# Patient Record
Sex: MALE | Race: WHITE | NOT HISPANIC OR LATINO | Employment: UNEMPLOYED | URBAN - METROPOLITAN AREA
[De-identification: names, ages, dates, MRNs, and addresses within clinical notes are randomized per-mention and may not be internally consistent; named-entity substitution may affect disease eponyms.]

---

## 2017-08-07 ENCOUNTER — OFFICE VISIT (OUTPATIENT)
Dept: FAMILY MEDICINE | Facility: CLINIC | Age: 22
End: 2017-08-07
Payer: COMMERCIAL

## 2017-08-07 VITALS
HEART RATE: 80 BPM | HEIGHT: 69 IN | RESPIRATION RATE: 12 BRPM | SYSTOLIC BLOOD PRESSURE: 134 MMHG | WEIGHT: 176.7 LBS | BODY MASS INDEX: 26.17 KG/M2 | TEMPERATURE: 97.9 F | DIASTOLIC BLOOD PRESSURE: 60 MMHG

## 2017-08-07 DIAGNOSIS — Z11.3 SCREEN FOR STD (SEXUALLY TRANSMITTED DISEASE): Primary | ICD-10-CM

## 2017-08-07 PROCEDURE — 86780 TREPONEMA PALLIDUM: CPT | Performed by: PHYSICIAN ASSISTANT

## 2017-08-07 PROCEDURE — 87491 CHLMYD TRACH DNA AMP PROBE: CPT | Performed by: PHYSICIAN ASSISTANT

## 2017-08-07 PROCEDURE — 36415 COLL VENOUS BLD VENIPUNCTURE: CPT | Performed by: PHYSICIAN ASSISTANT

## 2017-08-07 PROCEDURE — 99213 OFFICE O/P EST LOW 20 MIN: CPT | Performed by: PHYSICIAN ASSISTANT

## 2017-08-07 PROCEDURE — 87591 N.GONORRHOEAE DNA AMP PROB: CPT | Performed by: PHYSICIAN ASSISTANT

## 2017-08-07 PROCEDURE — 87389 HIV-1 AG W/HIV-1&-2 AB AG IA: CPT | Performed by: PHYSICIAN ASSISTANT

## 2017-08-07 NOTE — NURSING NOTE
"Chief Complaint   Patient presents with     STD     Panel Management     HPV, Chlamydia       Initial /60 (BP Location: Left arm, Patient Position: Chair, Cuff Size: Adult Regular)  Pulse 80  Temp 97.9  F (36.6  C) (Temporal)  Resp 12  Ht 5' 8.5\" (1.74 m)  Wt 176 lb 11.2 oz (80.2 kg)  BMI 26.47 kg/m2 Estimated body mass index is 26.47 kg/(m^2) as calculated from the following:    Height as of this encounter: 5' 8.5\" (1.74 m).    Weight as of this encounter: 176 lb 11.2 oz (80.2 kg).  Medication Reconciliation: complete     Kat Montes, [facility]  August 7, 2017      "

## 2017-08-07 NOTE — PROGRESS NOTES
"  SUBJECTIVE:                                                    Mario Villafana is a 22 year old male who presents to clinic today for the following health issues:      Concern - STD Check  Onset: over weekend    Description:   Had intercourse over weekend and would like to be check: ~ 72hrs ago    Progression of Symptoms:  none    Accompanying Signs & Symptoms:  none    Previous history of similar problem:   none    Problem list and histories reviewed & adjusted, as indicated.  Additional history: as documented    Had intercourse on Friday - ~72hrs ago- with new partner. Did use condoms. Someone does not hx for.   No personal hx of STDs.     Does not hurt when he voids. No swelling, redness to the skin.  No fevers, chills  No dysuria  No hematuria  No frequency, urgency  No discharge from the penis  No ulcers, sores, lumps/bumps.  No rectal pain  Had testicular torsion surgery left side - 18yr old. Maybe it \"lincoln hurts\" since the sexual encounter. \"Mild\". Off an on.  States he is anxious. Does not want parents to know, he is on their insurance, aware they may see bills, testing codes. \"States I'll deal with that later and still would like testing\".   Not sure he wants to do a physical exam.    Call cell with results.    Is leaving to go back to college next week.     ROS:  Constitutional, HEENT, cardiovascular, pulmonary, gi and gu systems are negative, except as otherwise noted.      OBJECTIVE:   /60 (BP Location: Left arm, Patient Position: Chair, Cuff Size: Adult Regular)  Pulse 80  Temp 97.9  F (36.6  C) (Temporal)  Resp 12  Ht 5' 8.5\" (1.74 m)  Wt 176 lb 11.2 oz (80.2 kg)  BMI 26.47 kg/m2  Body mass index is 26.47 kg/(m^2).  GENERAL: healthy, alert and no distress   (male): testicles normal without atrophy or masses, no epididymal enlargement or significant tenderness, no hernias and penis normal without urethral discharge  MS: no gross musculoskeletal defects noted  Psych: anxious mood and " "affect    Diagnostic Test Results:  pending    ASSESSMENT/PLAN:     1. Screen for STD (sexually transmitted disease)  Safe sex practices discussed and encouraged. STD screening as below.   Discussed considering retesting of HIV, syphilis (since early in exposure window today)  Discussed s/sx of STDs, should be retested if develops sx  If negative testing but has worsening left sided sx would treat for epididymitis. Offered empiric treatment today, pt declined, wants to wait on testing.   Able to remove MyChart proxy, but discussed cannot \"edit\" what insurance statements have for billing codes/testing.  - Chlamydia trachomatis PCR  - Neisseria gonorrhoeae PCR  - HIV Antigen Antibody Combo  - Anti Treponema    Follow Up: For worsening symptoms (ie new fevers, worsening pain, etc), non-improvement as expected/discussed, questions regarding your medications or treatment plan. Discussed parameters for follow up and included in After Visit Summary given to patient.      Rosamaria Morel PA-C  [facility]    "

## 2017-08-07 NOTE — MR AVS SNAPSHOT
After Visit Summary   8/7/2017    Mario Villafana    MRN: [id number]           Patient Information     Date Of Birth          1995        Visit Information        Provider Department      8/7/2017 2:00 PM Rosamaria Morel PA-C [facility]        Today's Diagnoses     Screen for STD (sexually transmitted disease)    -  1      Care Instructions    Will do STD screening - chlamydia, gonorrhea, HIV and syphilis    If the area becomes on the left becomes more tender, swollen, would treat for epididymitis    If you start to have symptoms of burning with urination or dripping from the penis or pain in scrotum follow up for recheck ([facility], etc)                Follow-ups after your visit        Who to contact     If you have questions or need follow up information about today's clinic visit or your schedule please contact [facility] directly at [phone].  Normal or non-critical lab and imaging results will be communicated to you by MyChart, letter or phone within 4 business days after the clinic has received the results. If you do not hear from us within 7 days, please contact the clinic through [employer] or phone. If you have a critical or abnormal lab result, we will notify you by phone as soon as possible.  Submit refill requests through [employer] or call your pharmacy and they will forward the refill request to us. Please allow 3 business days for your refill to be completed.          Additional Information About Your Visit        MyChart Information     [employer] gives you secure access to your electronic health record. If you see a primary care provider, you can also send messages to your care team and make appointments. If you have questions, please call your primary care clinic.  If you do not have a primary care provider, please call [phone] and they will assist you.        Care EveryWhere ID     This is your Care EveryWhere ID. This could be used by  "other organizations to access your [place] medical records  [id number]        Your Vitals Were     Pulse Temperature Respirations Height BMI (Body Mass Index)       80 97.9  F (36.6  C) (Temporal) 12 5' 8.5\" (1.74 m) 26.47 kg/m2        Blood Pressure from Last 3 Encounters:   08/07/17 134/60   01/13/16 120/70   08/06/15 118/58    Weight from Last 3 Encounters:   08/07/17 176 lb 11.2 oz (80.2 kg)   01/13/16 152 lb 14.4 oz (69.4 kg) (54 %)*   08/06/15 148 lb 12.8 oz (67.5 kg) (51 %)*     * Growth percentiles are based on [facility] 2-20 Years data.              We Performed the Following     Anti Treponema     Chlamydia trachomatis PCR     HIV Antigen Antibody Combo     Neisseria gonorrhoeae PCR        Primary Care Provider Office Phone # Fax #    Mitzi Shen [phone]       [facility] [address] [address] [address]        Equal Access to Services     [facility]: Hadii aad ku hadasho Soomaali, waaxda luqadaha, qaybta kaalmada ademateusz, diana starkey . So [facility] [phone].    ATENCIÓN: Si habla español, tiene a wade disposición servicios gratuitos de asistencia lingüística. Llame al [phone].    We comply with applicable federal civil rights laws and [place] laws. We do not discriminate on the basis of race, color, national origin, age, disability sex, sexual orientation or gender identity.            Thank you!     Thank you for choosing [facility]  for your care. Our goal is always to provide you with excellent care. Hearing back from our patients is one way we can continue to improve our services. Please take a few minutes to complete the written survey that you may receive in the mail after your visit with us. Thank you!             Your Updated Medication List - Protect others around you: Learn how to safely use, store and throw away your medicines at www.disposemymeds.org.          This list is accurate as of: 8/7/17  2:49 PM.  " Always use your most recent med list.                   Brand Name Dispense Instructions for use Diagnosis    IBUPROFEN PO           MUCINEX ALLERGY PO       Tonsillitis       NYQUIL PO           SUDAFED PO       Tonsillitis

## 2017-08-07 NOTE — PATIENT INSTRUCTIONS
Will do STD screening - chlamydia, gonorrhea, HIV and syphilis    If the area becomes on the left becomes more tender, swollen, would treat for epididymitis    If you start to have symptoms of burning with urination or dripping from the penis or pain in scrotum [facility] ([facility])

## 2017-08-08 LAB
C TRACH DNA SPEC QL NAA+PROBE: NORMAL
HIV 1+2 AB+HIV1 P24 AG SERPL QL IA: NORMAL
N GONORRHOEA DNA SPEC QL NAA+PROBE: NORMAL
SPECIMEN SOURCE: NORMAL
SPECIMEN SOURCE: NORMAL
T PALLIDUM IGG+IGM SER QL: NEGATIVE

## 2017-08-09 NOTE — PROGRESS NOTES
"  SUBJECTIVE:                                                    Mario Villafana is a 22 year old male who presents to clinic today for the following health issues:    Dysuria - was seen 08/07/17 for STD screen. Had a partner did not know well x1 7 days ago.   Is concerned having sx that could be STD.  Mild pain left scrotum- maybe better. \"Sometimes hurts when pee comes out\". Denies fevers, chills, penile discharge, frequency, urgency, hematuria. Denies ulcers, sores, bumps in genital area.  STD screening from 08/07/17 was negative.    Continues to be concerned about how this will show up on parents insurance statements. This was discussed at the last visit.     2. Acute Illness   Acute illness concerns: congestion  Onset: 1 week    Fever: no    Chills/Sweats: no    Headache (location?): no    Sinus Pressure:no    Conjunctivitis:  YES: bilateral a little bit but not really.     Ear Pain: YES: left a little bit    Rhinorrhea: no    Congestion: YES    Sore Throat: YES     Cough: YES-non-productive    Wheeze: YES- with exercise-- with running hard.    No CP, no SOB at rest, no pleuritic pain.      Decreased Appetite: no    Nausea: no    Vomiting: no    Diarrhea:  no    Dysuria/Freq.: no    Fatigue/Achiness: no    Sick/Strep Exposure: no    No asthma    \"I had something like this in January and was given a z-pack and it got better\"   Therapies Tried and outcome: none.    Problem list and histories reviewed & adjusted, as indicated.  Additional history: as documented    Patient Active Problem List   Diagnosis     CARDIOVASCULAR SCREENING; LDL GOAL LESS THAN 160     Past Surgical History:   Procedure Laterality Date     orchidopexy Bilateral 06/17/14    [facility]       Social History   Substance Use Topics     Smoking status: Never Smoker     Smokeless tobacco: Never Used     Alcohol use No     Family History   Problem Relation Age of Onset     Coronary Artery Disease No family hx of      Hypertension No " "family hx of      Hyperlipidemia No family hx of      Cancer - colorectal No family hx of      Ovarian Cancer No family hx of      Other Cancer No family hx of      Depression/Anxiety No family hx of      Mental Illness No family hx of      Thyroid Disease No family hx of      Asthma No family hx of      Chemical Addiction No family hx of      Obesity No family hx of          Current Outpatient Prescriptions   Medication Sig Dispense Refill     azithromycin (ZITHROMAX) 500 MG tablet Take 2 tablets (1,000 mg) by mouth once for 1 dose 2 tablet 0     cefTRIAXone (ROCEPHIN) 250 MG injection Inject 250 mg into the muscle once for 1 dose 1.25 mL 0     albuterol (PROAIR HFA/PROVENTIL HFA/VENTOLIN HFA) 108 (90 BASE) MCG/ACT Inhaler Inhale 2 puffs into the lungs every 6 hours as needed for shortness of breath / dyspnea or wheezing 1 Inhaler 0     Fexofenadine HCl (MUCINEX ALLERGY PO)        Pseudoephedrine HCl (SUDAFED PO)        Pseudoeph-Doxylamine-DM-APAP (NYQUIL PO)        IBUPROFEN PO        No Known Allergies  BP Readings from Last 3 Encounters:   08/10/17 120/60   08/07/17 134/60   01/13/16 120/70    Wt Readings from Last 3 Encounters:   08/10/17 176 lb 6.4 oz (80 kg)   08/07/17 176 lb 11.2 oz (80.2 kg)   01/13/16 152 lb 14.4 oz (69.4 kg) (54 %)*     * Growth percentiles are based on CDC 2-20 Years data.                  Labs reviewed in EPIC        Reviewed and updated as needed this visit by clinical staff     Reviewed and updated as needed this visit by Provider         ROS:  Constitutional, HEENT, cardiovascular, pulmonary, gi and gu systems are negative, except as otherwise noted.      OBJECTIVE:   /60  Pulse 95  Temp 97.7  F (36.5  C) (Temporal)  Resp 18  Ht 5' 8.5\" (1.74 m)  Wt 176 lb 6.4 oz (80 kg)  SpO2 97%  BMI 26.43 kg/m2  Body mass index is 26.43 kg/(m^2).  GENERAL: healthy, alert and no distress  EYES: Eyes grossly normal to inspection, PERRL and conjunctivae and sclerae normal  HENT: ear " canals and TM's normal, nose and mouth without ulcers or lesions  NECK: no adenopathy, no asymmetry, masses, or scars and thyroid normal to palpation  RESP: lungs clear to auscultation - no rales, rhonchi or wheezes  CV: regular rate and rhythm, normal S1 S2, no S3 or S4, no murmur, click or rub, no peripheral edema and peripheral pulses strong   (male): Testicles normal without atrophy or masses, penis normal without urethral discharge. Mild tenderness to palpation left epididymis, no scrotal erythema, swelling.  MS: no gross musculoskeletal defects noted, no edema    Diagnostic Test Results:  Results for orders placed or performed in visit on 08/07/17   HIV Antigen Antibody Combo   Result Value Ref Range    HIV Antigen Antibody Combo  NR     Nonreactive   HIV-1 p24 Ag & HIV-1/HIV-2 Ab Not Detected     Anti Treponema   Result Value Ref Range    Treponema pallidum Antibody Negative NEG   Chlamydia trachomatis PCR   Result Value Ref Range    Specimen Description Urine     Chlamydia Trachomatis PCR  NEG     Negative   Negative for C. trachomatis rRNA by transcription mediated amplification.   A negative result by transcription mediated amplification does not preclude the   presence of C. trachomatis infection because results are dependent on proper   and adequate collection, absence of inhibitors, and sufficient rRNA to be   detected.     Neisseria gonorrhoeae PCR   Result Value Ref Range    Specimen Descrip Urine     N Gonorrhea PCR  NEG     Negative   Negative for N. gonorrhoeae rRNA by transcription mediated amplification.   A negative result by transcription mediated amplification does not preclude the   presence of N. gonorrhoeae infection because results are dependent on proper   and adequate collection, absence of inhibitors, and sufficient rRNA to be   detected.         ASSESSMENT/PLAN:     1. Cough  Did send with rx for albuterol for cough he has noticed in past week with exercise.   Follow up if not  improving or worsening.   - albuterol (PROAIR HFA/PROVENTIL HFA/VENTOLIN HFA) 108 (90 BASE) MCG/ACT Inhaler; Inhale 2 puffs into the lungs every 6 hours as needed for shortness of breath / dyspnea or wheezing  Dispense: 1 Inhaler; Refill: 0    2. Dysuria  Patient would like empiric treatment for possible STD exposure.   This was offered at visit earlier in the week (08/07/17) prior to having test results available.  Pt has considered further in past few days and would feel best treating, despite neg screening.   He is concerned about billing to parents insurance. Discussed with him, that I cannot control what the insurance statements will say.   Admits he did schedule for cough today with intention that treatment would appear to be treatment for cough related sx.   Treatment as below for left sided epididymitis, covering for STD pathogens.   Abstain from intercourse x1 week.  Safe sex practices encouraged.    - azithromycin (ZITHROMAX) 500 MG tablet; Take 2 tablets (1,000 mg) by mouth once for 1 dose  Dispense: 2 tablet; Refill: 0  - cefTRIAXone (ROCEPHIN) 250 MG injection; Inject 250 mg into the muscle once for 1 dose  Dispense: 1.25 mL; Refill: 0  - CEFTRIAXONE NA INJ /250MG  - INJECTION INTRAMUSCULAR OR SUB-Q    Follow Up: For worsening symptoms (ie new fevers, worsening pain, etc), non-improvement as expected/discussed, questions regarding your medications or treatment plan. Discussed parameters for follow up and included in After Visit Summary given to patient.      Rosamaria Morel PA-C  [facility]

## 2017-08-10 ENCOUNTER — OFFICE VISIT (OUTPATIENT)
Dept: FAMILY MEDICINE | Facility: CLINIC | Age: 22
End: 2017-08-10
Payer: COMMERCIAL

## 2017-08-10 VITALS
WEIGHT: 176.4 LBS | SYSTOLIC BLOOD PRESSURE: 120 MMHG | OXYGEN SATURATION: 97 % | BODY MASS INDEX: 26.13 KG/M2 | RESPIRATION RATE: 18 BRPM | TEMPERATURE: 97.7 F | HEIGHT: 69 IN | HEART RATE: 95 BPM | DIASTOLIC BLOOD PRESSURE: 60 MMHG

## 2017-08-10 DIAGNOSIS — R30.0 DYSURIA: ICD-10-CM

## 2017-08-10 DIAGNOSIS — R05.9 COUGH: Primary | ICD-10-CM

## 2017-08-10 PROCEDURE — 99214 OFFICE O/P EST MOD 30 MIN: CPT | Mod: 25 | Performed by: PHYSICIAN ASSISTANT

## 2017-08-10 PROCEDURE — 96372 THER/PROPH/DIAG INJ SC/IM: CPT | Performed by: PHYSICIAN ASSISTANT

## 2017-08-10 RX ORDER — AZITHROMYCIN 500 MG/1
1000 TABLET, FILM COATED ORAL ONCE
Qty: 2 TABLET | Refills: 0 | Status: SHIPPED | OUTPATIENT
Start: 2017-08-10 | End: 2017-08-10

## 2017-08-10 RX ORDER — ALBUTEROL SULFATE 90 UG/1
2 AEROSOL, METERED RESPIRATORY (INHALATION) EVERY 6 HOURS PRN
Qty: 1 INHALER | Refills: 0 | Status: SHIPPED | OUTPATIENT
Start: 2017-08-10 | End: 2023-01-27

## 2017-08-10 RX ORDER — CEFTRIAXONE SODIUM 250 MG/1
250 INJECTION, POWDER, FOR SOLUTION INTRAMUSCULAR; INTRAVENOUS ONCE
Qty: 1.25 ML | Refills: 0 | Status: SHIPPED | OUTPATIENT
Start: 2017-08-10 | End: 2017-08-10

## 2017-08-10 ASSESSMENT — PAIN SCALES - GENERAL: PAINLEVEL: NO PAIN (0)

## 2017-08-10 NOTE — MR AVS SNAPSHOT
After Visit Summary   8/10/2017    Mario Villafana    MRN: [id number]           Patient Information     Date Of Birth          1995        Visit Information        Provider Department      8/10/2017 2:20 PM Rosamaria Morel PA-C [facility] [place]        Today's Diagnoses     Cough    -  1    Dysuria          Care Instructions    For the cough can use albuterol 2puffs every 4-6 hrs or 15 min prior to exercise    Zithromax- take BOTH tablets together with a meal    Also a shot today in clinic rocephin 250mg IM    If the urine symptoms are not better see urology              Follow-ups after your visit        Who to contact     If you have questions or need follow up information about today's clinic visit or your schedule please [facility] at [phone].  Normal or non-critical lab and imaging results will be communicated to you by [employer], letter or phone within 4 business days after the clinic has received the results. If you do not hear from us within 7 days, please contact the clinic through [employer] or phone. If you have a critical or abnormal lab result, we will notify you by phone as soon as possible.  Submit refill requests through [employer] or call your pharmacy and they will forward the refill request to us. Please allow 3 business days for your refill to be completed.          Additional Information About Your Visit        MyChart Information     [employer] gives you secure access to your electronic health record. If you see a primary care provider, you can also send messages to your care team and make appointments. If you have questions, please call your primary care clinic.  If you do not have a primary care provider, please call [phone] and they will assist you.        Care EveryWhere ID     This is your Care EveryWhere ID. This could be used by other organizations to access your [facility] medical records  [id number]        Your Vitals Were      "Pulse Temperature Respirations Height Pulse Oximetry BMI (Body Mass Index)    95 97.7  F (36.5  C) (Temporal) 18 5' 8.5\" (1.74 m) 97% 26.43 kg/m2       Blood Pressure from Last 3 Encounters:   08/10/17 120/60   08/07/17 134/60   01/13/16 120/70    Weight from Last 3 Encounters:   08/10/17 176 lb 6.4 oz (80 kg)   08/07/17 176 lb 11.2 oz (80.2 kg)   01/13/16 152 lb 14.4 oz (69.4 kg) (54 %)*     * Growth percentiles are based on [facility] 2-20 Years data.              We Performed the Following     UA reflex to Microscopic          Today's Medication Changes          These changes are accurate as of: 8/10/17  3:16 PM.  If you have any questions, ask your nurse or doctor.               Start taking these medicines.        Dose/Directions    albuterol 108 (90 BASE) MCG/ACT Inhaler   Commonly known as:  PROAIR HFA/PROVENTIL HFA/VENTOLIN HFA   Used for:  Cough   Started by:  Rosamaria Morel PA-C        Dose:  2 puff   Inhale 2 puffs into the lungs every 6 hours as needed for shortness of breath / dyspnea or wheezing   Quantity:  1 Inhaler   Refills:  0       azithromycin 500 MG tablet   Commonly known as:  ZITHROMAX   Used for:  Dysuria   Started by:  Rosamaria Morel PA-C        Dose:  1000 mg   Take 2 tablets (1,000 mg) by mouth once for 1 dose   Quantity:  2 tablet   Refills:  0       cefTRIAXone 250 MG injection   Commonly known as:  ROCEPHIN   Used for:  Dysuria   Started by:  Rosamaria Morel PA-C        Dose:  250 mg   Inject 250 mg into the muscle once for 1 dose   Quantity:  1.25 mL   Refills:  0            Where to get your medicines      These medications were sent to [facility]/pharmacy #[id number] - [place] - [address]  [address] [address] [place] [address]     Phone:  [phone]     albuterol 108 (90 BASE) MCG/ACT Inhaler    azithromycin 500 MG tablet         Some of these will need a paper prescription and others can be bought over the counter.  Ask your nurse if " you have questions.     Bring a paper prescription for each of these medications     cefTRIAXone 250 MG injection                Primary Care Provider Office Phone # Fax #    Mitzi Shen [phone] [address] [address] [address]        Equal Access to Services     JAVIER PALACIOS : Hadii aad ku hadasho Soomaali, waaxda luqadaha, qaybta kaalmada adeegyada, waxay agathain haylinnean adeeg shalonda jaky rosales. So [facility] [phone].    ATENCIÓN: Si habla español, tiene a wade disposición servicios gratuitos de asistencia lingüística. Llame al [phone].    We comply with applicable federal civil rights laws and [place] laws. We do not discriminate on the basis of race, color, national origin, age, disability sex, sexual orientation or gender identity.            Thank you!     Thank you for choosing [facility]  for your care. Our goal is always to provide you with excellent care. Hearing back from our patients is one way we can continue to improve our services. Please take a few minutes to complete the written survey that you may receive in the mail after your visit with us. Thank you!             Your Updated Medication List - Protect others around you: Learn how to safely use, store and throw away your medicines at www.disposemymeds.org.          This list is accurate as of: 8/10/17  3:16 PM.  Always use your most recent med list.                   Brand Name Dispense Instructions for use Diagnosis    albuterol 108 (90 BASE) MCG/ACT Inhaler    PROAIR HFA/PROVENTIL HFA/VENTOLIN HFA    1 Inhaler    Inhale 2 puffs into the lungs every 6 hours as needed for shortness of breath / dyspnea or wheezing    Cough       azithromycin 500 MG tablet    ZITHROMAX    2 tablet    Take 2 tablets (1,000 mg) by mouth once for 1 dose    Dysuria       cefTRIAXone 250 MG injection    ROCEPHIN    1.25 mL    Inject 250 mg into the muscle once for 1 dose    Dysuria       IBUPROFEN PO           MUCINEX  ALLERGY PO       Tonsillitis       NYQUIL PO           SUDAFED PO       Tonsillitis

## 2017-08-10 NOTE — NURSING NOTE
Prior to injection verified patient identity using patient's name and date of birth.      The following medication was given:     MEDICATION: Rocephin 250mg and Lidocaine 0.9cc  ROUTE: IM  SITE: RUQ - Gluteus  DOSE: 1 mL   LOT #: [id number]   :  [employer]   EXPIRATION DATE:  06/1/2019  NDC: 94988654-13  Provider: Rosamaria Morel PA-C     Lidocaine 1%  Lot # -[id number]   Exp 2/1/2019  NDC # 4224-0578-29    Isak Pollack MA

## 2017-08-10 NOTE — PATIENT INSTRUCTIONS
For the cough can use albuterol 2puffs every 4-6 hrs or 15 min prior to exercise    Zithromax- take BOTH tablets together with a meal    Also a shot today in clinic rocephin 250mg IM    If the urine symptoms are not better see urology

## 2019-01-03 ASSESSMENT — ENCOUNTER SYMPTOMS
COUGH: 1
NERVOUS/ANXIOUS: 1
HEADACHES: 0
PARESTHESIAS: 0
EYE PAIN: 0
HEMATURIA: 0
HEARTBURN: 0
CHILLS: 0
PALPITATIONS: 0
FEVER: 0
HEMATOCHEZIA: 0
ARTHRALGIAS: 0
DIZZINESS: 0
JOINT SWELLING: 0
DIARRHEA: 0
SORE THROAT: 1
CONSTIPATION: 1
SHORTNESS OF BREATH: 0
WEAKNESS: 0
ABDOMINAL PAIN: 0
NAUSEA: 0
DYSURIA: 0
MYALGIAS: 0
FREQUENCY: 0

## 2019-01-04 ASSESSMENT — ENCOUNTER SYMPTOMS
HEARTBURN: 0
EYE PAIN: 0
NERVOUS/ANXIOUS: 1
CHILLS: 0
PARESTHESIAS: 0
FREQUENCY: 0
HEADACHES: 0
FEVER: 0
DIARRHEA: 0
DIZZINESS: 0
PALPITATIONS: 0
DYSURIA: 0
NAUSEA: 0
WEAKNESS: 0
COUGH: 1
HEMATURIA: 0
SHORTNESS OF BREATH: 0
SORE THROAT: 1
CONSTIPATION: 1
MYALGIAS: 0
ABDOMINAL PAIN: 0
ARTHRALGIAS: 0
JOINT SWELLING: 0
HEMATOCHEZIA: 0

## 2019-01-04 NOTE — PROGRESS NOTES
SUBJECTIVE:   CC: Mario Villafana is an 23 year old male who presents for preventative health visit.     Physical   Annual:     Getting at least 3 servings of Calcium per day:  Yes    Bi-annual eye exam:  Yes    Dental care twice a year:  Yes    Sleep apnea or symptoms of sleep apnea:  None    Diet:  Regular (no restrictions)    Frequency of exercise:  4-5 days/week    Duration of exercise:  45-60 minutes    Taking medications regularly:  Yes    Medication side effects:  None    Additional concerns today:  No    PHQ-2 Total Score: 0    Patient notes that he will be studying abroad in [facility], [place] from February 2019- June 2019 and is wondering what vaccinations he will require. He will be staying in [facility]. He has about 1.5 years of college left. He is in college in [place] currently.     He denies any issues since his testicular torsion in 2014.    He exercises at the gym, but is not in sports currently. He relates that his nutrition is good and healthy.     He is in college and reports that he will drink alcohol twice a week, but denies drinking over 7 drinks a week.     Last year he had a few episodes of strep throat and tonsillitis and is liking to have his tonsils examined. Denies any current symptoms.     He notes occasional anxiety, but denies that it interferes with his day to day life.     He denies changes in urination. He notes that he will occasionally feels constipated where every now and then he will go 3-5 days without a bowel movement.       Today's PHQ-2 Score:   PHQ-2 ( 1999 Pfizer) 1/3/2019   Q1: Little interest or pleasure in doing things 0   Q2: Feeling down, depressed or hopeless 0   PHQ-2 Score 0   Q1: Little interest or pleasure in doing things Not at all   Q2: Feeling down, depressed or hopeless Not at all   PHQ-2 Score 0     Abuse: Current or Past(Physical, Sexual or Emotional)- No  Do you feel safe in your environment? Yes    Social History     Tobacco Use     Smoking status: Never  Smoker     Smokeless tobacco: Never Used   Substance Use Topics     Alcohol use: Yes     Comment: occ      Alcohol Use 1/3/2019   If you drink alcohol do you typically have greater than 3 drinks per day OR greater than 7 drinks per week? No   No flowsheet data found.    Last PSA: No results found for: PSA    Reviewed orders with patient. Reviewed health maintenance and updated orders accordingly - Yes  BP Readings from Last 3 Encounters:   01/07/19 112/64   08/10/17 120/60   08/07/17 134/60    Wt Readings from Last 3 Encounters:   01/07/19 76.2 kg (168 lb)   08/10/17 80 kg (176 lb 6.4 oz)   08/07/17 80.2 kg (176 lb 11.2 oz)        Patient Active Problem List   Diagnosis     CARDIOVASCULAR SCREENING; LDL GOAL LESS THAN 160     Past Surgical History:   Procedure Laterality Date     orchidopexy Bilateral 06/17/14    [facility]       Social History     Tobacco Use     Smoking status: Never Smoker     Smokeless tobacco: Never Used   Substance Use Topics     Alcohol use: Yes     Comment: occ      Family History   Problem Relation Age of Onset     Coronary Artery Disease No family hx of      Hypertension No family hx of      Hyperlipidemia No family hx of      Cancer - colorectal No family hx of      Ovarian Cancer No family hx of      Other Cancer No family hx of      Depression/Anxiety No family hx of      Mental Illness No family hx of      Thyroid Disease No family hx of      Asthma No family hx of      Chemical Addiction No family hx of      Obesity No family hx of          Current Outpatient Medications   Medication Sig Dispense Refill     albuterol (PROAIR HFA/PROVENTIL HFA/VENTOLIN HFA) 108 (90 BASE) MCG/ACT Inhaler Inhale 2 puffs into the lungs every 6 hours as needed for shortness of breath / dyspnea or wheezing (Patient not taking: Reported on 1/7/2019) 1 Inhaler 0     Fexofenadine HCl (MUCINEX ALLERGY PO)        IBUPROFEN PO        Pseudoeph-Doxylamine-DM-APAP (NYQUIL PO)         "Pseudoephedrine HCl (SUDAFED PO)        No Known Allergies  No lab results found.     Reviewed and updated as needed this visit by clinical staff  Tobacco  Allergies  Meds  Med Hx  Surg Hx  Fam Hx  Soc Hx      Reviewed and updated as needed this visit by Provider        Past Medical History:   Diagnosis Date     NO ACTIVE PROBLEMS      Testicular torsion 6/2014      Past Surgical History:   Procedure Laterality Date     orchidopexy Bilateral 06/17/14    [facility]     Review of Systems   Constitutional: Negative for chills and fever.   HENT: Positive for sore throat. Negative for congestion, ear pain and hearing loss.    Eyes: Negative for pain and visual disturbance.   Respiratory: Positive for cough. Negative for shortness of breath.    Cardiovascular: Negative for chest pain, palpitations and peripheral edema.   Gastrointestinal: Positive for constipation. Negative for abdominal pain, diarrhea, heartburn, hematochezia and nausea.   Genitourinary: Negative for discharge, dysuria, frequency, genital sores, hematuria, impotence and urgency.   Musculoskeletal: Negative for arthralgias, joint swelling and myalgias.   Skin: Positive for rash.   Neurological: Negative for dizziness, weakness, headaches and paresthesias.   Psychiatric/Behavioral: Negative for mood changes. The patient is nervous/anxious.      This document serves as a record of the services and decisions personally performed and made by Nicole Tejada CNP. It was created on his/her behalf by Natalie Mcgraw, trained medical scribe. The creation of this document is based the provider's statements to the medical scribes.    Lexie Mcgraw 1:05 PM, January 7, 2019  OBJECTIVE:   /64   Pulse 82   Temp 97.6  F (36.4  C) (Temporal)   Resp 16   Ht 1.753 m (5' 9\")   Wt 76.2 kg (168 lb)   SpO2 97%   BMI 24.81 kg/m      Physical Exam  GENERAL: healthy, alert and no distress  EYES: Eyes grossly normal to inspection, PERRL and " conjunctivae and sclerae normal  HENT: ear canals and TM's normal, nose and mouth without ulcers or lesions  NECK: no adenopathy, no asymmetry, masses, or scars and thyroid normal to palpation  RESP: lungs clear to auscultation - no rales, rhonchi or wheezes  CV: regular rate and rhythm, normal S1 S2, no S3 or S4, no murmur, click or rub, no peripheral edema and peripheral pulses strong  ABDOMEN: soft, nontender, no hepatosplenomegaly, no masses and bowel sounds normal   (male): normal male genitalia without lesions or urethral discharge, no hernia  MS: no gross musculoskeletal defects noted, no edema  SKIN: no suspicious lesions or rashes- rash not examined  NEURO: Normal strength and tone, mentation intact and speech normal  PSYCH: mentation appears normal, affect normal/bright    Diagnostic Test Results:  No results found for this or any previous visit (from the past 24 hour(s)).    ASSESSMENT/PLAN:       ICD-10-CM    1. Routine general medical examination at a health care facility Z00.00 Lipid panel reflex to direct LDL Fasting     TSH with free T4 reflex     Glucose     Hemoglobin   2. Encounter for routine adult medical exam with abnormal findings Z00.01    3. Need for prophylactic vaccination and inoculation against influenza Z23 HC FLU VAC PRESRV FREE QUAD SPLIT VIR 3+YRS IM  [[id number]]          ADMIN VACCINE, FIRST [[id number]]          ADMIN VACCINE, ADDL [[id number]]   4. Need for vaccination Z23    5. Screen for STD (sexually transmitted disease) Z11.3 Neisseria gonorrhoeae PCR     Chlamydia trachomatis PCR     Physical exam completed today. Updated routine and STD screening labs; will notify with results.    Discussed healthy diet, regular exercise, constipation, alcohol use, and safe sex practices    Influenza vaccination administered today. Hepatitis A vaccination administered today. HPV vaccination administered today.     Follow up with PCP in 1 year for annual physical exam with routine fasting lab work or  prn.     COUNSELING:   Reviewed preventive health counseling, as reflected in patient instructions       Regular exercise       Healthy diet/nutrition       Immunizations    Vaccinated for: Hepatitis A, Human Papillomavirus, and Influenza         Alcohol Use       Safe sex practices/STD prevention       HIV screeninx in teen years, 1x in adult years, and at intervals if high risk    Screening Questionnaire for Adult Immunization    Are you sick today?   No   Do you have allergies to medications, food, a vaccine component or latex?   No   Have you ever had a serious reaction after receiving a vaccination?   No   Do you have a long-term health problem with heart disease, lung disease, asthma, kidney disease, metabolic disease (e.g. diabetes), anemia, or other blood disorder?   No   Do you have cancer, leukemia, HIV/AIDS, or any other immune system problem?   No   In the past 3 months, have you taken medications that affect  your immune system, such as prednisone, other steroids, or anticancer drugs; drugs for the treatment of rheumatoid arthritis, Crohn s disease, or psoriasis; or have you had radiation treatments?   No   Have you had a seizure, or a brain or other nervous system problem?   No   During the past year, have you received a transfusion of blood or blood     products, or been given immune (gamma) globulin or antiviral drug?   No   For women: Are you pregnant or is there a chance you could become        pregnant during the next month?   No   Have you received any vaccinations in the past 4 weeks?   No     Immunization questionnaire answers were all negative.        Per orders of ANY,  given by Rachel Dawkins. Patient instructed to remain in clinic for 15 minutes afterwards, and to report any adverse reaction to me immediately.       Screening performed by Rachel Dawkins on 2019 at 1:32 PM.      BP Readings from Last 1 Encounters:   19 112/64     Estimated body mass index is 24.81 kg/m  as  "calculated from the following:    Height as of this encounter: 1.753 m (5' 9\").    Weight as of this encounter: 76.2 kg (168 lb).     reports that  has never smoked. he has never used smokeless tobacco.    Counseling Resources:  ATP IV Guidelines  Pooled Cohorts Equation Calculator  FRAX Risk Assessment  ICSI Preventive Guidelines  Dietary Guidelines for Americans, 2010  USDA's MyPlate  ASA Prophylaxis  Lung CA Screening    The information in this document, created by the medical scribe for me, accurately reflects the services I personally performed and the decisions made by me. I have reviewed and approved this document for accuracy prior to leaving the patient care area.  Nicole Tejada CNP  1:05 PM, 01/07/19    RALEIGH Anders CNP  [facility]  "

## 2019-01-07 ENCOUNTER — OFFICE VISIT (OUTPATIENT)
Dept: FAMILY MEDICINE | Facility: CLINIC | Age: 24
End: 2019-01-07
Payer: COMMERCIAL

## 2019-01-07 VITALS
TEMPERATURE: 97.6 F | BODY MASS INDEX: 24.88 KG/M2 | HEART RATE: 82 BPM | DIASTOLIC BLOOD PRESSURE: 64 MMHG | SYSTOLIC BLOOD PRESSURE: 112 MMHG | OXYGEN SATURATION: 97 % | WEIGHT: 168 LBS | RESPIRATION RATE: 16 BRPM | HEIGHT: 69 IN

## 2019-01-07 DIAGNOSIS — Z00.01 ENCOUNTER FOR ROUTINE ADULT MEDICAL EXAM WITH ABNORMAL FINDINGS: ICD-10-CM

## 2019-01-07 DIAGNOSIS — Z23 NEED FOR VACCINATION: ICD-10-CM

## 2019-01-07 DIAGNOSIS — Z23 NEED FOR PROPHYLACTIC VACCINATION AND INOCULATION AGAINST INFLUENZA: ICD-10-CM

## 2019-01-07 DIAGNOSIS — Z11.3 SCREEN FOR STD (SEXUALLY TRANSMITTED DISEASE): ICD-10-CM

## 2019-01-07 DIAGNOSIS — Z00.00 ROUTINE GENERAL MEDICAL EXAMINATION AT A HEALTH CARE FACILITY: Primary | ICD-10-CM

## 2019-01-07 LAB
CHOLEST SERPL-MCNC: 158 MG/DL
GLUCOSE SERPL-MCNC: 81 MG/DL (ref 70–99)
HDLC SERPL-MCNC: 70 MG/DL
HGB BLD-MCNC: 14.8 G/DL (ref 13.3–17.7)
LDLC SERPL CALC-MCNC: 72 MG/DL
NONHDLC SERPL-MCNC: 88 MG/DL
TRIGL SERPL-MCNC: 82 MG/DL
TSH SERPL DL<=0.005 MIU/L-ACNC: 1.71 MU/L (ref 0.4–4)

## 2019-01-07 PROCEDURE — 90651 9VHPV VACCINE 2/3 DOSE IM: CPT | Performed by: NURSE PRACTITIONER

## 2019-01-07 PROCEDURE — 90632 HEPA VACCINE ADULT IM: CPT | Performed by: NURSE PRACTITIONER

## 2019-01-07 PROCEDURE — 87591 N.GONORRHOEAE DNA AMP PROB: CPT | Performed by: NURSE PRACTITIONER

## 2019-01-07 PROCEDURE — 87491 CHLMYD TRACH DNA AMP PROBE: CPT | Performed by: NURSE PRACTITIONER

## 2019-01-07 PROCEDURE — 84443 ASSAY THYROID STIM HORMONE: CPT | Performed by: NURSE PRACTITIONER

## 2019-01-07 PROCEDURE — 90471 IMMUNIZATION ADMIN: CPT | Performed by: NURSE PRACTITIONER

## 2019-01-07 PROCEDURE — 36415 COLL VENOUS BLD VENIPUNCTURE: CPT | Performed by: NURSE PRACTITIONER

## 2019-01-07 PROCEDURE — 90686 IIV4 VACC NO PRSV 0.5 ML IM: CPT | Performed by: NURSE PRACTITIONER

## 2019-01-07 PROCEDURE — 85018 HEMOGLOBIN: CPT | Performed by: NURSE PRACTITIONER

## 2019-01-07 PROCEDURE — 99395 PREV VISIT EST AGE 18-39: CPT | Mod: 25 | Performed by: NURSE PRACTITIONER

## 2019-01-07 PROCEDURE — 90472 IMMUNIZATION ADMIN EACH ADD: CPT | Performed by: NURSE PRACTITIONER

## 2019-01-07 PROCEDURE — 80061 LIPID PANEL: CPT | Performed by: NURSE PRACTITIONER

## 2019-01-07 PROCEDURE — 82947 ASSAY GLUCOSE BLOOD QUANT: CPT | Performed by: NURSE PRACTITIONER

## 2019-01-07 ASSESSMENT — MIFFLIN-ST. JEOR: SCORE: 1757.42

## 2019-01-07 ASSESSMENT — PAIN SCALES - GENERAL: PAINLEVEL: NO PAIN (0)

## 2019-01-08 LAB
C TRACH DNA SPEC QL NAA+PROBE: NEGATIVE
N GONORRHOEA DNA SPEC QL NAA+PROBE: NEGATIVE
SPECIMEN SOURCE: NORMAL
SPECIMEN SOURCE: NORMAL

## 2019-06-27 ENCOUNTER — OFFICE VISIT (OUTPATIENT)
Dept: FAMILY MEDICINE | Facility: CLINIC | Age: 24
End: 2019-06-27
Payer: COMMERCIAL

## 2019-06-27 VITALS
DIASTOLIC BLOOD PRESSURE: 58 MMHG | TEMPERATURE: 98.3 F | HEIGHT: 69 IN | HEART RATE: 72 BPM | BODY MASS INDEX: 24.59 KG/M2 | SYSTOLIC BLOOD PRESSURE: 110 MMHG | RESPIRATION RATE: 14 BRPM | WEIGHT: 166 LBS | OXYGEN SATURATION: 97 %

## 2019-06-27 DIAGNOSIS — B36.0 TINEA VERSICOLOR: Primary | ICD-10-CM

## 2019-06-27 PROCEDURE — 99213 OFFICE O/P EST LOW 20 MIN: CPT | Performed by: NURSE PRACTITIONER

## 2019-06-27 RX ORDER — FLUCONAZOLE 150 MG/1
TABLET ORAL
Qty: 4 TABLET | Refills: 0 | Status: SHIPPED | OUTPATIENT
Start: 2019-06-27 | End: 2019-07-22

## 2019-06-27 ASSESSMENT — MIFFLIN-ST. JEOR: SCORE: 1748.35

## 2019-06-27 ASSESSMENT — PAIN SCALES - GENERAL: PAINLEVEL: NO PAIN (0)
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
## 2019-06-27 NOTE — PATIENT INSTRUCTIONS
- Try using Selsun Blue or Head & Shoulders on your skin for maintenance purposes. Lather in the shower and leave it on for 10 minutes once a month.     - Change sheets and pillow cases frequently. Do not wear/stay in wet clothes.

## 2019-07-22 ENCOUNTER — OFFICE VISIT (OUTPATIENT)
Dept: FAMILY MEDICINE | Facility: CLINIC | Age: 24
End: 2019-07-22
Payer: COMMERCIAL

## 2019-07-22 VITALS
OXYGEN SATURATION: 100 % | HEART RATE: 63 BPM | WEIGHT: 167 LBS | BODY MASS INDEX: 24.73 KG/M2 | SYSTOLIC BLOOD PRESSURE: 110 MMHG | TEMPERATURE: 98.3 F | RESPIRATION RATE: 16 BRPM | HEIGHT: 69 IN | DIASTOLIC BLOOD PRESSURE: 80 MMHG

## 2019-07-22 DIAGNOSIS — R10.11 RUQ ABDOMINAL PAIN: Primary | ICD-10-CM

## 2019-07-22 LAB
ALBUMIN UR-MCNC: NEGATIVE MG/DL
APPEARANCE UR: CLEAR
BASOPHILS # BLD AUTO: 0 10E9/L (ref 0–0.2)
BASOPHILS NFR BLD AUTO: 0.3 %
BILIRUB UR QL STRIP: NEGATIVE
COLOR UR AUTO: YELLOW
DIFFERENTIAL METHOD BLD: NORMAL
EOSINOPHIL # BLD AUTO: 0.3 10E9/L (ref 0–0.7)
EOSINOPHIL NFR BLD AUTO: 4.9 %
ERYTHROCYTE [DISTWIDTH] IN BLOOD BY AUTOMATED COUNT: 12 % (ref 10–15)
GLUCOSE UR STRIP-MCNC: NEGATIVE MG/DL
HCT VFR BLD AUTO: 42.8 % (ref 40–53)
HGB BLD-MCNC: 14.9 G/DL (ref 13.3–17.7)
HGB UR QL STRIP: NEGATIVE
KETONES UR STRIP-MCNC: NEGATIVE MG/DL
LEUKOCYTE ESTERASE UR QL STRIP: NEGATIVE
LYMPHOCYTES # BLD AUTO: 2 10E9/L (ref 0.8–5.3)
LYMPHOCYTES NFR BLD AUTO: 28.7 %
MCH RBC QN AUTO: 31.4 PG (ref 26.5–33)
MCHC RBC AUTO-ENTMCNC: 34.8 G/DL (ref 31.5–36.5)
MCV RBC AUTO: 90 FL (ref 78–100)
MONOCYTES # BLD AUTO: 0.6 10E9/L (ref 0–1.3)
MONOCYTES NFR BLD AUTO: 9 %
NEUTROPHILS # BLD AUTO: 4 10E9/L (ref 1.6–8.3)
NEUTROPHILS NFR BLD AUTO: 57.1 %
NITRATE UR QL: NEGATIVE
PH UR STRIP: 7 PH (ref 5–7)
PLATELET # BLD AUTO: 272 10E9/L (ref 150–450)
RBC # BLD AUTO: 4.74 10E12/L (ref 4.4–5.9)
SOURCE: NORMAL
SP GR UR STRIP: 1.01 (ref 1–1.03)
UROBILINOGEN UR STRIP-ACNC: 0.2 EU/DL (ref 0.2–1)
WBC # BLD AUTO: 7 10E9/L (ref 4–11)

## 2019-07-22 PROCEDURE — 85025 COMPLETE CBC W/AUTO DIFF WBC: CPT | Performed by: PHYSICIAN ASSISTANT

## 2019-07-22 PROCEDURE — 99214 OFFICE O/P EST MOD 30 MIN: CPT | Performed by: PHYSICIAN ASSISTANT

## 2019-07-22 PROCEDURE — 36415 COLL VENOUS BLD VENIPUNCTURE: CPT | Performed by: PHYSICIAN ASSISTANT

## 2019-07-22 PROCEDURE — 80053 COMPREHEN METABOLIC PANEL: CPT | Performed by: PHYSICIAN ASSISTANT

## 2019-07-22 PROCEDURE — 81003 URINALYSIS AUTO W/O SCOPE: CPT | Performed by: PHYSICIAN ASSISTANT

## 2019-07-22 PROCEDURE — 83690 ASSAY OF LIPASE: CPT | Performed by: PHYSICIAN ASSISTANT

## 2019-07-22 RX ORDER — OMEPRAZOLE 40 MG/1
40 CAPSULE, DELAYED RELEASE ORAL DAILY
Qty: 30 CAPSULE | Refills: 1 | Status: SHIPPED | OUTPATIENT
Start: 2019-07-22 | End: 2019-09-22

## 2019-07-22 RX ORDER — SUCRALFATE 1 G/1
1 TABLET ORAL 4 TIMES DAILY
Qty: 40 TABLET | Refills: 0 | Status: SHIPPED | OUTPATIENT
Start: 2019-07-22 | End: 2023-01-27

## 2019-07-22 ASSESSMENT — PAIN SCALES - GENERAL: PAINLEVEL: SEVERE PAIN (7)

## 2019-07-22 ASSESSMENT — MIFFLIN-ST. JEOR: SCORE: 1740.01

## 2019-07-22 NOTE — PROGRESS NOTES
"Subjective     Mario Villafana is a 24 year old male who presents to clinic today for the following health issues:    HPI   ABDOMINAL and FLANK   PAIN     Onset: couple weeks, have had one episode of right abdomen pain about couple months ago.   A few mo ago had pain that lasted a few days and went away while he was in [place] studying abroad. He has been back to [place] for about 1 month and since home he is consuming more alcohol than usual. Thinks it is causing pain. Went out with friends this weekend and thinks from that. He is concerned about his liver.   Eating fast food. Drinking beer and liquor over the weekend. Then pain returned. RUQ and into the back.   No nausea or vomiting.   Last drank alcohol 2 d ago.   Pain is constant/dull, consistent. No change w/eating. Feels better after a BM.   No dysuria, hematuria, freq/urgency.   No fevers/chills.   Was on fluconazole tinea versicolor last month.     Description:   Character: Sharp and Dull ache  Location: right upper quadrant  Radiation: Back- right side - in the last couple day     Intensity: severe    Progression of Symptoms:  Worsening- the last couple days     Accompanying Signs & Symptoms:  Fever/Chills?: YES- \"im little cold today\"   Gas/Bloating: YES- bloated   Nausea: no, poor sleep last night   Vomitting: no   Diarrhea?: no   Constipation:no, hx of constipation- \"it usually resolved\"    Dysuria or Hematuria: no    History:   Trauma: no   Previous similar pain: YES   Previous tests done: none    Precipitating factors:   Does the pain change with:     Food: YES- \"high fat, I get little more bloated\"      BM: YES- \"the pain goes away for a little bit\"     Urination: no     Alleviating factors:  none    Therapies Tried and outcome: nothing recently     LMP:  not applicable       Patient Active Problem List   Diagnosis     CARDIOVASCULAR SCREENING; LDL GOAL LESS THAN 160     Past Surgical History:   Procedure Laterality Date     orchidopexy Bilateral 06/17/14 " "   [facility]       Social History     Tobacco Use     Smoking status: Never Smoker     Smokeless tobacco: Never Used   Substance Use Topics     Alcohol use: Yes     Comment: 2-3 weekly     Family History   Problem Relation Age of Onset     Coronary Artery Disease No family hx of      Hypertension No family hx of      Hyperlipidemia No family hx of      Cancer - colorectal No family hx of      Ovarian Cancer No family hx of      Other Cancer No family hx of      Depression/Anxiety No family hx of      Mental Illness No family hx of      Thyroid Disease No family hx of      Asthma No family hx of      Chemical Addiction No family hx of      Obesity No family hx of          Current Outpatient Medications   Medication Sig Dispense Refill     omeprazole (PRILOSEC) 40 MG DR capsule Take 1 capsule (40 mg) by mouth daily 30 capsule 1     sucralfate (CARAFATE) 1 GM tablet Take 1 tablet (1 g) by mouth 4 times daily 40 tablet 0     albuterol (PROAIR HFA/PROVENTIL HFA/VENTOLIN HFA) 108 (90 BASE) MCG/ACT Inhaler Inhale 2 puffs into the lungs every 6 hours as needed for shortness of breath / dyspnea or wheezing (Patient not taking: Reported on 6/27/2019) 1 Inhaler 0     Fexofenadine HCl (MUCINEX ALLERGY PO)        IBUPROFEN PO        Pseudoeph-Doxylamine-DM-APAP (NYQUIL PO)        Pseudoephedrine HCl (SUDAFED PO)        No Known Allergies  BP Readings from Last 3 Encounters:   07/22/19 110/80   06/27/19 110/58   01/07/19 112/64    Wt Readings from Last 3 Encounters:   07/22/19 75.8 kg (167 lb)   06/27/19 75.3 kg (166 lb)   01/07/19 76.2 kg (168 lb)                    Reviewed and updated as needed this visit by Provider         Review of Systems   ROS COMP: Constitutional, HEENT, cardiovascular, pulmonary, gi and gu systems are negative, except as otherwise noted.      Objective    /80   Pulse 63   Temp 98.3  F (36.8  C) (Temporal)   Resp 16   Ht 1.74 m (5' 8.5\")   Wt 75.8 kg (167 lb)   SpO2 100%  "  BMI 25.02 kg/m    Body mass index is 25.02 kg/m .  Physical Exam   GENERAL: healthy, alert and mildly uncomfortable  EYES: Eyes grossly normal to inspection, PERRL and conjunctivae and sclerae normal, anicteric  HENT: ear canals and TM's normal, nose and mouth without ulcers or lesions  NECK: no adenopathy, no asymmetry, masses, or scars and thyroid normal to palpation  RESP: lungs clear to auscultation - no rales, rhonchi or wheezes  CV: regular rate and rhythm, normal S1 S2, no S3 or S4, no murmur, click or rub, no peripheral edema and peripheral pulses strong  ABDOMEN: soft, + RUQ tenderness, +epigastric tenderness, without hepatosplenomegaly or masses and bowel sounds normal  MS: no gross musculoskeletal defects noted, no edema  SKIN: no suspicious lesions or rashes  NEURO: Normal strength and tone, mentation intact and speech normal  BACK: no CVA tenderness, no paralumbar tenderness  PSYCH: mentation appears normal, affect normal, mildly anxious    Diagnostic Test Results:  Results for orders placed or performed in visit on 07/22/19 (from the past 24 hour(s))   CBC with platelets and differential   Result Value Ref Range    WBC 7.0 4.0 - 11.0 10e9/L    RBC Count 4.74 4.4 - 5.9 10e12/L    Hemoglobin 14.9 13.3 - 17.7 g/dL    Hematocrit 42.8 40.0 - 53.0 %    MCV 90 78 - 100 fl    MCH 31.4 26.5 - 33.0 pg    MCHC 34.8 31.5 - 36.5 g/dL    RDW 12.0 10.0 - 15.0 %    Platelet Count 272 150 - 450 10e9/L    % Neutrophils 57.1 %    % Lymphocytes 28.7 %    % Monocytes 9.0 %    % Eosinophils 4.9 %    % Basophils 0.3 %    Absolute Neutrophil 4.0 1.6 - 8.3 10e9/L    Absolute Lymphocytes 2.0 0.8 - 5.3 10e9/L    Absolute Monocytes 0.6 0.0 - 1.3 10e9/L    Absolute Eosinophils 0.3 0.0 - 0.7 10e9/L    Absolute Basophils 0.0 0.0 - 0.2 10e9/L    Diff Method Automated Method    UA reflex to Microscopic   Result Value Ref Range    Color Urine Yellow     Appearance Urine Clear     Glucose Urine Negative NEG^Negative mg/dL    Bilirubin  "Urine Negative NEG^Negative    Ketones Urine Negative NEG^Negative mg/dL    Specific Gravity Urine 1.010 1.003 - 1.035    Blood Urine Negative NEG^Negative    pH Urine 7.0 5.0 - 7.0 pH    Protein Albumin Urine Negative NEG^Negative mg/dL    Urobilinogen Urine 0.2 0.2 - 1.0 EU/dL    Nitrite Urine Negative NEG^Negative    Leukocyte Esterase Urine Negative NEG^Negative    Source Midstream Urine            Assessment & Plan     1. RUQ abdominal pain  Heavy social alcohol use and diet with copious fast food.   Treat for gastritis as below  Advised no alcohol and no nsaids.  Was on fluconazole recently. Obtain comp panel and lipase.   Discussed abd US if not improving or if labs are abnormal  Discussed d/dx  S/sx discussed for ER care.  Normal vitals, WBC and urine  - CBC with platelets and differential  - UA reflex to Microscopic  - Comprehensive metabolic panel (BMP + Alb, Alk Phos, ALT, AST, Total. Bili, TP)  - Lipase  - US Abdomen Complete; Future  - sucralfate (CARAFATE) 1 GM tablet; Take 1 tablet (1 g) by mouth 4 times daily  Dispense: 40 tablet; Refill: 0  - omeprazole (PRILOSEC) 40 MG DR capsule; Take 1 capsule (40 mg) by mouth daily  Dispense: 30 capsule; Refill: 1     BMI:   Estimated body mass index is 25.02 kg/m  as calculated from the following:    Height as of this encounter: 1.74 m (5' 8.5\").    Weight as of this encounter: 75.8 kg (167 lb).           Follow Up: The patient was instructed to contact clinic for worsening symptoms, non-improvement as expected/discussed, and for questions regarding medications or treatment plan. Discussed parameters for follow up and included in After Visit Summary given to patient.      Return in about 4 weeks (around 8/19/2019).    Rosamaria Morel PA-C  [facility]    "

## 2019-07-23 ENCOUNTER — TELEPHONE (OUTPATIENT)
Dept: FAMILY MEDICINE | Facility: CLINIC | Age: 24
End: 2019-07-23

## 2019-07-23 LAB
ALBUMIN SERPL-MCNC: 4.6 G/DL (ref 3.4–5)
ALP SERPL-CCNC: 78 U/L (ref 40–150)
ALT SERPL W P-5'-P-CCNC: 22 U/L (ref 0–70)
ANION GAP SERPL CALCULATED.3IONS-SCNC: 6 MMOL/L (ref 3–14)
AST SERPL W P-5'-P-CCNC: 21 U/L (ref 0–45)
BILIRUB SERPL-MCNC: 0.5 MG/DL (ref 0.2–1.3)
BUN SERPL-MCNC: 10 MG/DL (ref 7–30)
CALCIUM SERPL-MCNC: 9.6 MG/DL (ref 8.5–10.1)
CHLORIDE SERPL-SCNC: 103 MMOL/L (ref 94–109)
CO2 SERPL-SCNC: 28 MMOL/L (ref 20–32)
CREAT SERPL-MCNC: 0.86 MG/DL (ref 0.66–1.25)
GFR SERPL CREATININE-BSD FRML MDRD: >90 ML/MIN/{1.73_M2}
GLUCOSE SERPL-MCNC: 84 MG/DL (ref 70–99)
LIPASE SERPL-CCNC: 83 U/L (ref 73–393)
POTASSIUM SERPL-SCNC: 4.2 MMOL/L (ref 3.4–5.3)
PROT SERPL-MCNC: 8.4 G/DL (ref 6.8–8.8)
SODIUM SERPL-SCNC: 137 MMOL/L (ref 133–144)

## 2019-07-23 NOTE — TELEPHONE ENCOUNTER
Left message asking patient to return call.  Please inform patient of RESULTS from Provider below.     Kidney function (serum creatinine and GFR),  electrolyte tests, and liver tests are normal.   Pancreas lab test is normal.   Would continue current treatment plan. If abdominal pain is not slowly improving with treatment started at visit yesterday (over next few days) would then have him proceed with the ultrasound.     Rosamaria Morel PA-C

## 2019-08-02 ENCOUNTER — TELEPHONE (OUTPATIENT)
Dept: FAMILY MEDICINE | Facility: CLINIC | Age: 24
End: 2019-08-02

## 2019-08-02 ENCOUNTER — ANCILLARY PROCEDURE (OUTPATIENT)
Dept: ULTRASOUND IMAGING | Facility: CLINIC | Age: 24
End: 2019-08-02
Attending: PHYSICIAN ASSISTANT
Payer: COMMERCIAL

## 2019-08-02 DIAGNOSIS — R10.11 RUQ ABDOMINAL PAIN: ICD-10-CM

## 2019-08-02 PROCEDURE — 76700 US EXAM ABDOM COMPLETE: CPT | Performed by: RADIOLOGY

## 2019-08-02 NOTE — TELEPHONE ENCOUNTER
Left message asking patient to return call.  Please inform patient of RESULTS from Provider below.    Please contact pt with the following message:     His abdominal ultrasound was normal. Normal liver, gallbladder, pancreas, and kidneys.   Would advise OV to recheck if his sx are persisting/not improving as expected with treatment plan from last visit.     Rosamaria Morel PA-C

## 2019-08-02 NOTE — TELEPHONE ENCOUNTER
Patient returned message below. Advised patient of the results and and understand with no further questions.

## 2019-09-22 DIAGNOSIS — R10.11 RUQ ABDOMINAL PAIN: ICD-10-CM

## 2019-09-24 RX ORDER — OMEPRAZOLE 40 MG/1
CAPSULE, DELAYED RELEASE ORAL
Qty: 30 CAPSULE | Refills: 0 | Status: SHIPPED | OUTPATIENT
Start: 2019-09-24 | End: 2023-01-27

## 2019-09-24 NOTE — TELEPHONE ENCOUNTER
Pending Prescriptions:                       Disp   Refills    omeprazole (PRILOSEC) 40 MG DR capsule [P*30 cap*0            Sig: TAKE 1 CAPSULE BY MOUTH EVERY DAY    Prescription approved per [facility] Refill Protocol.    Ayesha Gregory, RN, BSN

## 2019-10-15 ENCOUNTER — MYC REFILL (OUTPATIENT)
Dept: FAMILY MEDICINE | Facility: CLINIC | Age: 24
End: 2019-10-15

## 2019-10-15 DIAGNOSIS — R10.11 RUQ ABDOMINAL PAIN: ICD-10-CM

## 2019-10-15 NOTE — TELEPHONE ENCOUNTER
Pending Prescriptions:                       Disp   Refills    omeprazole (PRILOSEC) 40 MG DR capsule    30 cap*0          Routing refill request to provider for review/approval because:  Martha given x1 and patient did not follow up, please advise  Due for recheck OV 8/19/19        Karen Cross, RN, BSN

## 2019-10-16 RX ORDER — OMEPRAZOLE 40 MG/1
CAPSULE, DELAYED RELEASE ORAL
Qty: 30 CAPSULE | Refills: 0 | OUTPATIENT
Start: 2019-10-16

## 2019-10-18 ENCOUNTER — MYC REFILL (OUTPATIENT)
Dept: FAMILY MEDICINE | Facility: CLINIC | Age: 24
End: 2019-10-18

## 2019-10-18 DIAGNOSIS — R10.11 RUQ ABDOMINAL PAIN: ICD-10-CM

## 2019-10-18 NOTE — TELEPHONE ENCOUNTER
Pending Prescriptions:                       Disp   Refills    omeprazole (PRILOSEC) 40 MG DR capsule     30 cap*0            Routing refill request to provider for review/approval because:  Martha given x1 and patient did not follow up, please advise.      Radha Diamond RN BSN

## 2019-10-21 RX ORDER — OMEPRAZOLE 40 MG/1
CAPSULE, DELAYED RELEASE ORAL
Qty: 30 CAPSULE | Refills: 0 | OUTPATIENT
Start: 2019-10-21

## 2019-11-06 ENCOUNTER — HEALTH MAINTENANCE LETTER (OUTPATIENT)
Age: 24
End: 2019-11-06

## 2020-02-16 ENCOUNTER — HEALTH MAINTENANCE LETTER (OUTPATIENT)
Age: 25
End: 2020-02-16

## 2020-11-29 ENCOUNTER — HEALTH MAINTENANCE LETTER (OUTPATIENT)
Age: 25
End: 2020-11-29

## 2021-04-10 ENCOUNTER — HEALTH MAINTENANCE LETTER (OUTPATIENT)
Age: 26
End: 2021-04-10

## 2021-09-19 ENCOUNTER — HEALTH MAINTENANCE LETTER (OUTPATIENT)
Age: 26
End: 2021-09-19

## 2022-05-01 ENCOUNTER — HEALTH MAINTENANCE LETTER (OUTPATIENT)
Age: 27
End: 2022-05-01

## 2022-11-21 ENCOUNTER — HEALTH MAINTENANCE LETTER (OUTPATIENT)
Age: 27
End: 2022-11-21

## 2022-11-29 ENCOUNTER — E-VISIT (OUTPATIENT)
Dept: URGENT CARE | Facility: URGENT CARE | Age: 27
End: 2022-11-29
Payer: COMMERCIAL

## 2022-11-29 DIAGNOSIS — J01.90 ACUTE BACTERIAL SINUSITIS: Primary | ICD-10-CM

## 2022-11-29 DIAGNOSIS — B96.89 ACUTE BACTERIAL SINUSITIS: Primary | ICD-10-CM

## 2022-11-29 PROCEDURE — 99421 OL DIG E/M SVC 5-10 MIN: CPT | Performed by: NURSE PRACTITIONER

## 2022-11-29 NOTE — PATIENT INSTRUCTIONS
Sinusitis (Antibiotic Treatment)    The sinuses are air-filled spaces within the bones of the face. They connect to the inside of the nose. Sinusitis is an inflammation of the tissue that lines the sinuses. Sinusitis can occur during a cold. It can also happen due to allergies to pollens and other particles in the air. Sinusitis can cause symptoms of sinus congestion and a feeling of fullness. A sinus infection causes fever, headache, and facial pain. There is often green or yellow fluid draining from the nose or into the back of the throat (post-nasal drip). You have been given antibiotics to treat this condition.   Home care    Take the full course of antibiotics as instructed. Don't stop taking them, even when you feel better.    Drink plenty of water, hot tea, and other liquids as directed by the healthcare provider. This may help thin nasal mucus. It also may help your sinuses drain fluids.    Heat may help soothe painful areas of your face. Use a towel soaked in hot water. Or,  the shower and direct the warm spray onto your face. Using a vaporizer along with a menthol rub at night may also help soothe symptoms.     An expectorant with guaifenesin may help thin nasal mucus and help your sinuses drain fluids. Talk with your provider or pharmacists before taking an over-the-counter (OTC) medicine if you have any questions about it or its side effects..    You can use an OTC decongestant, unless a similar medicine was prescribed to you. Nasal sprays work the fastest. Use one that contains phenylephrine or oxymetazoline. First blow your nose gently. Then use the spray. Don't use these medicines more often than directed on the label. If you do, your symptoms may get worse. You may also take pills that contain pseudoephedrine. Don t use products that combine multiple medicines. This is because side effects may be increased. Read labels. You can also ask the pharmacist for help. (People with high blood  pressure should not use decongestants. They can raise blood pressure.) Talk with your provider or pharmacist if you have any questions about the medicine..    OTC antihistamines may help if allergies contributed to your sinusitis. Talk with your provider or pharmacist if you have any questions about the medicine..    Don't use nasal rinses or irrigation during an acute sinus infection, unless your healthcare provider tells you to. Rinsing may spread the infection to other areas in your sinuses.    Use acetaminophen or ibuprofen to control pain, unless another pain medicine was prescribed to you. If you have chronic liver or kidney disease or ever had a stomach ulcer, talk with your healthcare provider before using these medicines. Never give aspirin to anyone under age 18 who is ill with a fever. It may cause severe liver damage.    Don't smoke. This can make symptoms worse.    Follow-up care  Follow up with your healthcare provider, or as advised.   When to seek medical advice  Call your healthcare provider if any of these occur:     Facial pain or headache that gets worse    Stiff neck    Unusual drowsiness or confusion    Swelling of your forehead or eyelids    Symptoms don't go away in 10 days    Vision problems, such as blurred or double vision    Fever of 100.4 F (38 C) or higher, or as directed by your healthcare provider  Call 911  Call 911 if any of these occur:     Seizure    Trouble breathing    Feeling dizzy or faint    Fingernails, skin or lips look blue, purple , or gray  Prevention  Here are steps you can take to help prevent an infection:     Keep good hand washing habits.    Don t have close contact with people who have sore throats, colds, or other upper respiratory infections.    Don t smoke, and stay away from secondhand smoke.    Stay up to date with of your vaccines.  [employer] last reviewed this educational content on 12/1/2019 2000-2021 [employer]. All rights reserved. This  information is not intended as a substitute for professional medical care. Always follow your healthcare professional's instructions.        Dear Mario Villafana    After reviewing your responses, I've been able to diagnose you with Acute bacterial sinusitis.      Based on your responses and diagnosis, I have prescribed   Orders Placed This Encounter     amoxicillin-clavulanate (AUGMENTIN) 875-125 MG tablet    to treat your symptoms. I have sent this to your pharmacy.?     It is also important to stay well hydrated, get lots of rest and take over-the-counter decongestants,?tylenol?or ibuprofen if you?are able to?take those medications per your primary care provider to help relieve discomfort.?     It is important that you take?all of?your prescribed medication even if your symptoms are improving after a few doses.? Taking?all of?your medicine helps prevent the symptoms from returning.?     If your symptoms worsen, you develop severe headache, vomiting, high fever (>102), or are not improving in 7 days, please contact your primary care provider for an appointment or visit any of our convenient Walk-in Care or Urgent Care Centers to be seen which can be found on our website?here.?     Thanks again for choosing?us?as your health care partner,?   ?  RALEIGH Cano CNP?

## 2023-01-27 ENCOUNTER — OFFICE VISIT (OUTPATIENT)
Dept: FAMILY MEDICINE | Facility: CLINIC | Age: 28
End: 2023-01-27
Payer: COMMERCIAL

## 2023-01-27 VITALS
SYSTOLIC BLOOD PRESSURE: 96 MMHG | DIASTOLIC BLOOD PRESSURE: 74 MMHG | WEIGHT: 159 LBS | TEMPERATURE: 97.4 F | RESPIRATION RATE: 12 BRPM | HEART RATE: 73 BPM | OXYGEN SATURATION: 97 % | BODY MASS INDEX: 23.55 KG/M2 | HEIGHT: 69 IN

## 2023-01-27 DIAGNOSIS — Z00.00 ROUTINE GENERAL MEDICAL EXAMINATION AT A HEALTH CARE FACILITY: Primary | ICD-10-CM

## 2023-01-27 DIAGNOSIS — Z23 NEED FOR HPV VACCINATION: ICD-10-CM

## 2023-01-27 DIAGNOSIS — Z13.1 SCREENING FOR DIABETES MELLITUS: ICD-10-CM

## 2023-01-27 DIAGNOSIS — Z23 NEED FOR IMMUNIZATION AGAINST INFLUENZA: ICD-10-CM

## 2023-01-27 DIAGNOSIS — Z28.21 COVID-19 VACCINATION DECLINED: ICD-10-CM

## 2023-01-27 DIAGNOSIS — Z13.220 LIPID SCREENING: ICD-10-CM

## 2023-01-27 LAB
ALBUMIN SERPL-MCNC: 4.5 G/DL (ref 3.4–5)
ALP SERPL-CCNC: 53 U/L (ref 40–150)
ALT SERPL W P-5'-P-CCNC: 23 U/L (ref 0–70)
ANION GAP SERPL CALCULATED.3IONS-SCNC: 6 MMOL/L (ref 3–14)
AST SERPL W P-5'-P-CCNC: 18 U/L (ref 0–45)
BILIRUB SERPL-MCNC: 0.5 MG/DL (ref 0.2–1.3)
BUN SERPL-MCNC: 21 MG/DL (ref 7–30)
CALCIUM SERPL-MCNC: 9.7 MG/DL (ref 8.5–10.1)
CHLORIDE BLD-SCNC: 107 MMOL/L (ref 94–109)
CHOLEST SERPL-MCNC: 168 MG/DL
CO2 SERPL-SCNC: 27 MMOL/L (ref 20–32)
CREAT SERPL-MCNC: 1 MG/DL (ref 0.66–1.25)
FASTING STATUS PATIENT QL REPORTED: YES
GFR SERPL CREATININE-BSD FRML MDRD: >90 ML/MIN/1.73M2
GLUCOSE BLD-MCNC: 88 MG/DL (ref 70–99)
HDLC SERPL-MCNC: 65 MG/DL
LDLC SERPL CALC-MCNC: 92 MG/DL
NONHDLC SERPL-MCNC: 103 MG/DL
POTASSIUM BLD-SCNC: 4.2 MMOL/L (ref 3.4–5.3)
PROT SERPL-MCNC: 8 G/DL (ref 6.8–8.8)
SODIUM SERPL-SCNC: 140 MMOL/L (ref 133–144)
TRIGL SERPL-MCNC: 54 MG/DL

## 2023-01-27 PROCEDURE — 90651 9VHPV VACCINE 2/3 DOSE IM: CPT | Performed by: FAMILY MEDICINE

## 2023-01-27 PROCEDURE — 90472 IMMUNIZATION ADMIN EACH ADD: CPT | Performed by: FAMILY MEDICINE

## 2023-01-27 PROCEDURE — 80061 LIPID PANEL: CPT | Performed by: FAMILY MEDICINE

## 2023-01-27 PROCEDURE — 99385 PREV VISIT NEW AGE 18-39: CPT | Mod: 25 | Performed by: FAMILY MEDICINE

## 2023-01-27 PROCEDURE — 80053 COMPREHEN METABOLIC PANEL: CPT | Performed by: FAMILY MEDICINE

## 2023-01-27 PROCEDURE — 90471 IMMUNIZATION ADMIN: CPT | Performed by: FAMILY MEDICINE

## 2023-01-27 PROCEDURE — 36415 COLL VENOUS BLD VENIPUNCTURE: CPT | Performed by: FAMILY MEDICINE

## 2023-01-27 PROCEDURE — 90686 IIV4 VACC NO PRSV 0.5 ML IM: CPT | Performed by: FAMILY MEDICINE

## 2023-01-27 ASSESSMENT — ENCOUNTER SYMPTOMS
DIZZINESS: 0
CONSTIPATION: 0
CHILLS: 0
COUGH: 0
HEADACHES: 0
ARTHRALGIAS: 0
HEMATURIA: 0
JOINT SWELLING: 0
EYE PAIN: 0
HEARTBURN: 0
NAUSEA: 0
HEMATOCHEZIA: 0
PARESTHESIAS: 0
SHORTNESS OF BREATH: 0
ABDOMINAL PAIN: 0
PALPITATIONS: 0
MYALGIAS: 0
DYSURIA: 0
NERVOUS/ANXIOUS: 0
DIARRHEA: 0
SORE THROAT: 0
WEAKNESS: 0
FREQUENCY: 0
FEVER: 0

## 2023-01-27 ASSESSMENT — PAIN SCALES - GENERAL: PAINLEVEL: NO PAIN (0)

## 2023-01-27 NOTE — PROGRESS NOTES
SUBJECTIVE:   CC: Mario is an 27 year old who presents for preventative health visit.     Patient has been advised of split billing requirements and indicates understanding: Yes  Healthy Habits:     Getting at least 3 servings of Calcium per day:  Yes    Bi-annual eye exam:  Yes    Dental care twice a year:  Yes    Sleep apnea or symptoms of sleep apnea:  None    Diet:  Regular (no restrictions)    Frequency of exercise:  4-5 days/week    Duration of exercise:  30-45 minutes    Taking medications regularly:  Yes    Medication side effects:  None    PHQ-2 Total Score: 0    Additional concerns today:  No    Today's PHQ-2 Score:   PHQ-2 ( 1999 Pf[employer]) 1/27/2023   Q1: Little interest or pleasure in doing things 0   Q2: Feeling down, depressed or hopeless 0   PHQ-2 Score 0   PHQ-2 Total Score (12-17 Years)- Positive if 3 or more points; Administer PHQ-A if positive -   Q1: Little interest or pleasure in doing things Not at all   Q2: Feeling down, depressed or hopeless Not at all   PHQ-2 Score 0     Have you ever done Advance Care Planning? (For example, a Health Directive, POLST, or a discussion with a medical provider or your loved ones about your wishes): No, advance care planning information given to patient to review.  Patient declined advance care planning discussion at this time.    Social History     Tobacco Use     Smoking status: Never     Smokeless tobacco: Never   Substance Use Topics     Alcohol use: Yes     Comment: 2-3 weekly     Alcohol Use 1/27/2023   Prescreen: >3 drinks/day or >7 drinks/week? Not Applicable   Prescreen: >3 drinks/day or >7 drinks/week? -     Last PSA: No results found for: PSA    Reviewed orders with patient. Reviewed health maintenance and updated orders accordingly - Yes  Lab work is in process  BP Readings from Last 3 Encounters:   01/27/23 96/74   07/22/19 110/80   06/27/19 110/58    Wt Readings from Last 3 Encounters:   01/27/23 72.1 kg (159 lb)   07/22/19 75.8 kg (167 lb)    06/27/19 75.3 kg (166 lb)                  Patient Active Problem List   Diagnosis     CARDIOVASCULAR SCREENING; LDL GOAL LESS THAN 160     Past Surgical History:   Procedure Laterality Date     orchidopexy Bilateral 06/17/14    [facility]       Social History     Tobacco Use     Smoking status: Never     Smokeless tobacco: Never   Substance Use Topics     Alcohol use: Yes     Comment: 2-3 weekly     Family History   Problem Relation Age of Onset     Coronary Artery Disease No family hx of      Hypertension No family hx of      Hyperlipidemia No family hx of      Cancer - colorectal No family hx of      Ovarian Cancer No family hx of      Other Cancer No family hx of      Depression/Anxiety No family hx of      Mental Illness No family hx of      Thyroid Disease No family hx of      Asthma No family hx of      Chemical Addiction No family hx of      Obesity No family hx of          Current Outpatient Medications   Medication Sig Dispense Refill     Fexofenadine HCl (MUCINEX ALLERGY PO)  (Patient not taking: Reported on 1/27/2023)       IBUPROFEN PO  (Patient not taking: Reported on 1/27/2023)       Pseudoeph-Doxylamine-DM-APAP (NYQUIL PO)  (Patient not taking: Reported on 1/27/2023)       Pseudoephedrine HCl (SUDAFED PO)  (Patient not taking: Reported on 1/27/2023)       No Known Allergies    Reviewed and updated as needed this visit by clinical staff   Tobacco  Allergies  Meds  Problems  Med Hx  Surg Hx  Fam Hx        Reviewed and updated as needed this visit by Provider   Tobacco  Allergies  Meds  Problems  Med Hx  Surg Hx  Fam Hx       Social history reviewed.  Past Medical History:   Diagnosis Date     NO ACTIVE PROBLEMS      Testicular torsion 6/2014      Past Surgical History:   Procedure Laterality Date     orchidopexy Bilateral 06/17/14    [facility]     Review of Systems   Constitutional: Negative for chills and fever.   HENT: Negative for congestion,  "ear pain, hearing loss and sore throat.    Eyes: Negative for pain and visual disturbance.   Respiratory: Negative for cough and shortness of breath.    Cardiovascular: Negative for chest pain, palpitations and peripheral edema.   Gastrointestinal: Negative for abdominal pain, constipation, diarrhea, heartburn, hematochezia and nausea.   Genitourinary: Negative for dysuria, frequency, genital sores, hematuria and urgency.   Musculoskeletal: Negative for arthralgias, joint swelling and myalgias.   Skin: Negative for rash.   Neurological: Negative for dizziness, weakness, headaches and paresthesias.   Psychiatric/Behavioral: Negative for mood changes. The patient is not nervous/anxious.      OBJECTIVE:   BP 96/74   Pulse 73   Temp 97.4  F (36.3  C) (Temporal)   Resp 12   Ht 1.745 m (5' 8.7\")   Wt 72.1 kg (159 lb)   SpO2 97%   BMI 23.69 kg/m      Physical Exam  GENERAL: healthy, alert and no distress  EYES: Eyes grossly normal to inspection, PERRL and conjunctivae and sclerae normal  HENT: ear canals and TM's normal, nose and mouth without ulcers or lesions  NECK: no adenopathy, no asymmetry, masses, or scars and thyroid normal to palpation  RESP: lungs clear to auscultation - no rales, rhonchi or wheezes  CV: regular rate and rhythm, normal S1 S2, no S3 or S4, no murmur, click or rub, no peripheral edema and peripheral pulses strong  ABDOMEN: soft, nontender, no hepatosplenomegaly, no masses and bowel sounds normal  MS: no gross musculoskeletal defects noted, no edema  SKIN: no suspicious lesions or rashes  NEURO: Normal strength and tone, mentation intact and speech normal  PSYCH: mentation appears normal, affect normal/bright    Labs: pending    ASSESSMENT/PLAN:   1. Routine general medical examination at a health care facility: Discussed personal health and safety. Routine screenings as below. Appropriate anticipatory guidance, vaccinations, and health screening recommendations delivered according to the " USPSTF and other appropriate society guidelines.  Patient understands and is agreeable with the plan ordered below.  - REVIEW OF HEALTH MAINTENANCE PROTOCOL ORDERS  - Human Papilloma Virus Vaccine (Gardasil 9) 3 Dose IM  - INFLUENZA VACCINE IM > 6 MONTHS VALENT IIV4 (AFLURIA/FLUZONE)  - Lipid panel reflex to direct LDL Fasting; Future  - Comprehensive metabolic panel (BMP + Alb, Alk Phos, ALT, AST, Total. Bili, TP); Future  - PRIMARY CARE FOLLOW-UP SCHEDULING; Future  - Lipid panel reflex to direct LDL Fasting  - Comprehensive metabolic panel (BMP + Alb, Alk Phos, ALT, AST, Total. Bili, TP)    2. Lipid screening  - Lipid panel reflex to direct LDL Fasting; Future  - Lipid panel reflex to direct LDL Fasting    3. Screening for diabetes mellitus  - Comprehensive metabolic panel (BMP + Alb, Alk Phos, ALT, AST, Total. Bili, TP); Future  - Comprehensive metabolic panel (BMP + Alb, Alk Phos, ALT, AST, Total. Bili, TP)    4. COVID-19 vaccination declined    5. Need for immunization against influenza  - INFLUENZA VACCINE IM > 6 MONTHS VALENT IIV4 (AFLURIA/FLUZONE)    6. Need for HPV vaccination  - Human Papilloma Virus Vaccine (Gardasil 9) 3 Dose IM    COUNSELING:   Reviewed preventive health counseling, as reflected in patient instructions       Regular exercise       Healthy diet/nutrition       Vision screening       Hearing screening       Safe sex practices/STD prevention    He reports that he has never smoked. He has never used smokeless tobacco.    Jermain Arzate MD  [facility]    Disclaimer: This note consists of symbols derived from keyboarding, dictation and/or voice recognition software. As a result, there may be errors in the script that have gone undetected. Please consider this when interpreting information found in this chart.

## 2023-01-27 NOTE — RESULT ENCOUNTER NOTE
Please inform of results if patient has not viewed in [employer] within 3 business days.    CMP Results - Your blood sugar was 88. Your kidney function, electrolytes, and liver function were normal.    Please call the clinic with any questions you may have.     Have a great day,    Dr. Abreu

## 2023-01-27 NOTE — NURSING NOTE
Prior to immunization administration, verified patients identity using patient s name and date of birth. Please see Immunization Activity for additional information.     Screening Questionnaire for Adult Immunization    Are you sick today?   No   Do you have allergies to medications, food, a vaccine component or latex?   No   Have you ever had a serious reaction after receiving a vaccination?   No   Do you have a long-term health problem with heart, lung, kidney, or metabolic disease (e.g., diabetes), asthma, a blood disorder, no spleen, complement component deficiency, a cochlear implant, or a spinal fluid leak?  Are you on long-term aspirin therapy?   No   Do you have cancer, leukemia, HIV/AIDS, or any other immune system problem?   No   Do you have a parent, brother, or sister with an immune system problem?   No   In the past 3 months, have you taken medications that affect  your immune system, such as prednisone, other steroids, or anticancer drugs; drugs for the treatment of rheumatoid arthritis, Crohn s disease, or psoriasis; or have you had radiation treatments?   No   Have you had a seizure, or a brain or other nervous system problem?   No   During the past year, have you received a transfusion of blood or blood    products, or been given immune (gamma) globulin or antiviral drug?   No   For women: Are you pregnant or is there a chance you could become       pregnant during the next month?   No   Have you received any vaccinations in the past 4 weeks?   No     Immunization questionnaire answers were all negative.        Patient instructed to remain in clinic for 15 minutes afterwards, and to report any adverse reaction to me immediately.       Screening performed by Nadya Taylor on 1/27/2023 at 9:27 AM.

## 2023-01-27 NOTE — PATIENT INSTRUCTIONS
Pepcid twice daily for heartburn    Tylenol is better than ibuprofen for your stomach, when needed for general aches and pains.    Preventive Health Recommendations  Male Ages 21 - 25     Yearly exam:             See your health care provider every year in order to  o   Review health changes.   o   Discuss preventive care.    o   Review your medicines if your doctor has prescribed any.  You should be tested each year for STDs (sexually transmitted diseases).   Talk to your provider about cholesterol testing.    If you are at risk for diabetes, you should have a diabetes test (fasting glucose).    Shots: Get a flu shot each year. Get a tetanus shot every 10 years.     Nutrition:  Eat at least 5 servings of fruits and vegetables daily.   Eat whole-grain bread, whole-wheat pasta and brown rice instead of white grains and rice.   Get adequate calcium and Vitamin D.     Lifestyle  Exercise for at least 150 minutes a week (30 minutes a day, 5 days a week). This will help you control your weight and prevent disease.   Limit alcohol to one drink per day.   No smoking.   Wear sunscreen to prevent skin cancer.   See your dentist every six months for an exam and cleaning.

## 2024-04-14 ENCOUNTER — HEALTH MAINTENANCE LETTER (OUTPATIENT)
Age: 29
End: 2024-04-14

## 2025-04-19 ENCOUNTER — HEALTH MAINTENANCE LETTER (OUTPATIENT)
Age: 30
End: 2025-04-19